# Patient Record
Sex: FEMALE | Race: WHITE
[De-identification: names, ages, dates, MRNs, and addresses within clinical notes are randomized per-mention and may not be internally consistent; named-entity substitution may affect disease eponyms.]

---

## 2019-04-06 ENCOUNTER — HOSPITAL ENCOUNTER (EMERGENCY)
Dept: HOSPITAL 62 - ER | Age: 19
Discharge: HOME | End: 2019-04-06
Payer: COMMERCIAL

## 2019-04-06 VITALS — DIASTOLIC BLOOD PRESSURE: 67 MMHG | SYSTOLIC BLOOD PRESSURE: 125 MMHG

## 2019-04-06 DIAGNOSIS — R07.89: Primary | ICD-10-CM

## 2019-04-06 DIAGNOSIS — Z88.0: ICD-10-CM

## 2019-04-06 PROCEDURE — 93005 ELECTROCARDIOGRAM TRACING: CPT

## 2019-04-06 PROCEDURE — 71045 X-RAY EXAM CHEST 1 VIEW: CPT

## 2019-04-06 PROCEDURE — 99283 EMERGENCY DEPT VISIT LOW MDM: CPT

## 2019-04-06 PROCEDURE — 93010 ELECTROCARDIOGRAM REPORT: CPT

## 2019-04-06 NOTE — ER DOCUMENT REPORT
ED Cardiac





- General


Chief Complaint: Chest Pain


Stated Complaint: LEFT ARM NUMBNESS,CHEST PAIN


Time Seen by Provider: 04/06/19 22:53


Primary Care Provider: 


BELKIS JEAN NP [Primary Care Provider] - Follow up in 3-5 days


TRAVEL OUTSIDE OF THE [place] IN LAST 30 DAYS: No





- HPI


Notes: 





Patient is a 19-year-old female that presents to the emergency department for 

chief complaint of chest and left arm pain.


Patient states she started having a crampy left-sided chest pain today and 

aching in her left shoulder and upper arm.  The pain is constant and worse with 

movement.  She has not taken any medicine at home for her pain and denies 

relieving factors.  She states she has been unpacking boxes at work recently.  

She denies any direct trauma or injury.  She does smoke tobacco but denies 

excessive coughing, fevers and shortness of breath.  Patient states that she 

frequently gets pain in her chest and attributes it to stress.  She denies any 

suicidal or homicidal thoughts but does states she has anxiety and depression 

that she jennifer with at home without medications.  She denies recent surgery, 

travel, hospitalizations, history of DVT/PE, lower extremity cramping or swel

ling.  She is not on any oral estrogen medications.  She reports maternal and 

paternal grandfathers had heart disease but denies any parental heart disease or

DVT/PE.  





Past Medical History: Anxiety, depression





Past Surgical History: Negative





Social History: Daily tobacco.  Occasional alcohol.  Denies drug use





Family History: Reviewed and noncontributory for presenting illness





Allergies: Reviewed, see documented allergy list.








REVIEW OF SYSTEMS:





CONSTITUTIONAL : 





No fever





No chills





No diaphoresis





No recent illness





EENT:





No vision changes





No congestion





No sore throat  





CARDIOVASCULAR:





chest pain





No palpitations





RESPIRATORY:





No shortness of breath





No cough





No difficulty breathing





GASTROINTESTINAL: 





No abdominal pain





No nausea





No vomiting





No diarrhea





GENITOURINARY:





No dysuria





No hematuria





No difficulty urinating





MUSCULOSKELETAL:





No back pain





No leg pain





 arm pain





SKIN:  





No rashes





No lesions





LYMPHATIC: 





No swollen, enlarged glands.





NEUROLOGICAL: 





No lightheadedness





No headache





No weakness





No paresthesias





PSYCHIATRIC:





No anxiety





No depression 








PHYSICAL EXAMINATION:





Vital signs reviewed, nursing noted reviewed.





GENERAL: Well-appearing, well-nourished and in no acute distress.





HEAD: Atraumatic, normocephalic.





EYES: Eyes appear normal, extraocular movements intact, sclera anicteric, 

conjunctiva are normal.





ENT: nares patent, oropharynx clear without exudates.  Moist mucous membranes.





NECK: Normal range of motion, supple without lymphadenopathy





LUNGS: Tenderness to palpation of left anterior and posterior chest wall, no 

tachypnea, breath sounds clear to auscultation bilaterally and equal.  No 

wheezes rales or rhonchi.





HEART: Regular rate and rhythm without murmurs





ABDOMEN: Soft, nontender, normoactive bowel sounds.  No rebound, guarding, or 

rigidity. No masses appreciated.





EXTREMITIES: Tenderness along medial border of left scapula, no bony tenderness 

to the left shoulder, normal left shoulder range of motion with no joint 

effusion or erythema.  Negative Homans sign bilaterally.  No pitting or edema. 





NEUROLOGICAL: No focal neurological deficits. Moves all extremities 

spontaneously Motor and sensory grossly intact on exam.





PSYCH: Normal mood, normal affect.





SKIN: Warm, Dry, normal turgor, no rashes or lesions noted on exposed skin











- Related Data


Allergies/Adverse Reactions: 


                                        





amoxicillin [Amoxicillin] Adverse Reaction (Intermediate, Verified 08/07/14 

12:13)


   Hives











Past Medical History





- Social History


Smoking Status: Never Smoker


Chew tobacco use (# tins/day): No


Frequency of alcohol use: None


Drug Abuse: None


Family History: Reviewed & Not Pertinent


Patient has suicidal ideation: No


Patient has homicidal ideation: No


Renal/ Medical History: Denies: Hx Peritoneal Dialysis





- Immunizations


Immunizations up to date: Yes


Hx Diphtheria, Pertussis, Tetanus Vaccination: No





Physical Exam





- Vital signs


Vitals: 


                                        











Temp Pulse Resp BP Pulse Ox


 


 98.9 F   94   16   125/67   98 


 


 04/06/19 22:17  04/06/19 22:17  04/06/19 22:17  04/06/19 22:17  04/06/19 22:17














Course





- Re-evaluation


Re-evalutation: 





04/06/19 23:12


Vitals reviewed.  Nursing notes reviewed.  Patient's EKG shows no acute ischemic

 changes.  Her left anterior and posterior chest walls as well as medial left 

scapular border are tender.  She does have a repetitive job and her symptoms are

 likely musculoskeletal.  She is PERC negative and I do not clinically suspect 

pulmonary embolism.  Chest x-ray shows no pneumothorax.  Patient will be 

discharged home with a course of anti-inflammatories.  She was counseled on 

smoking cessation.  She was counseled on return precautions.  She is in 

agreement with plan of care at discharge.





                                        





Chest X-Ray  04/06/19 22:54


IMPRESSION:


 


Normal chest x-ray.


 


 


copyright 2011 [employer]- All Rights Reserved


 

















- Vital Signs


Vital signs: 


                                        











Temp Pulse Resp BP Pulse Ox


 


 98.9 F   94   16   125/67   98 


 


 04/06/19 22:17  04/06/19 22:17  04/06/19 22:17  04/06/19 22:17  04/06/19 22:17














- EKG Interpretation by Me


Additional EKG results interpreted by me: 





04/06/19 23:13


Interpreted by myself


2213: Normal sinus rhythm, rate 86, normal axis, no ectopy, no Wellens or 

Brugada





Discharge





- Discharge


Clinical Impression: 


 Left-sided chest wall pain





Condition: Stable


Disposition: HOME, SELF-CARE


Instructions:  Chest Wall Pain ([facility])


Additional Instructions: 


Please return to the emergency department if you have any worsening, or concern 

of your symptoms.





Please return to the emergency department if you develop chest pain, difficulty 

breathing, severe abdominal pain, or ongoing vomiting.





Please follow-up with your primary care physician in 2-3 days and any other 

recommended physicians.





If prescribed, take all medications as directed. 





If you have any questions or concerns do not hesitate to return the emergency 

department for evaluation.











Prescriptions: 


Naproxen 500 mg PO BID PRN #30 tablet


 PRN Reason: Pain Scale Of 1


Forms:  Smoking Cessation Education


Referrals: 


BELKIS JEAN NP [Primary Care Provider] - Follow up in 3-5 days

## 2019-04-06 NOTE — RADIOLOGY REPORT (SQ)
EXAM DESCRIPTION: 



XR CHEST 1 VIEW



COMPLETED DATE/TME:  04/06/2019 22:54



CLINICAL HISTORY: 



19 years, Female, chest pain



COMPARISON:

None.



NUMBER OF VIEWS:





TECHNIQUE:





LIMITATIONS:

None.



FINDINGS:



No evidence of pulmonary infiltrate or pleural effusion.



The heart and mediastinum are unremarkable.



Pulmonary vascularity appears normal.



IMPRESSION:



Normal chest x-ray.

 



copyright 2011 [employer]- All Rights Reserved

## 2019-04-07 NOTE — EKG REPORT
SEVERITY:- BORDERLINE ECG -

SINUS RHYTHM

BORDERLINE T ABNORMALITIES, INFERIOR LEADS

:

Confirmed by: Rigoberto Lind MD 07-Apr-2019 09:33:23

## 2020-12-06 ENCOUNTER — HOSPITAL ENCOUNTER (EMERGENCY)
Dept: HOSPITAL 62 - ER | Age: 20
LOS: 1 days | Discharge: HOME | End: 2020-12-07
Payer: SELF-PAY

## 2020-12-06 DIAGNOSIS — L29.2: Primary | ICD-10-CM

## 2020-12-06 DIAGNOSIS — F17.200: ICD-10-CM

## 2020-12-06 DIAGNOSIS — R10.9: ICD-10-CM

## 2020-12-06 DIAGNOSIS — R10.2: ICD-10-CM

## 2020-12-06 PROCEDURE — 87591 N.GONORRHOEAE DNA AMP PROB: CPT

## 2020-12-06 PROCEDURE — 81001 URINALYSIS AUTO W/SCOPE: CPT

## 2020-12-06 PROCEDURE — 81025 URINE PREGNANCY TEST: CPT

## 2020-12-06 PROCEDURE — 87210 SMEAR WET MOUNT SALINE/INK: CPT

## 2020-12-06 PROCEDURE — 99284 EMERGENCY DEPT VISIT MOD MDM: CPT

## 2020-12-06 PROCEDURE — 76830 TRANSVAGINAL US NON-OB: CPT

## 2020-12-06 PROCEDURE — 93976 VASCULAR STUDY: CPT

## 2020-12-06 PROCEDURE — 87491 CHLMYD TRACH DNA AMP PROBE: CPT

## 2020-12-07 VITALS — DIASTOLIC BLOOD PRESSURE: 74 MMHG | SYSTOLIC BLOOD PRESSURE: 118 MMHG

## 2020-12-07 LAB
APPEARANCE UR: CLEAR
APTT PPP: YELLOW S
BILIRUB UR QL STRIP: NEGATIVE
CHLAM PCR: NOT DETECTED
GLUCOSE UR STRIP-MCNC: NEGATIVE MG/DL
KETONES UR STRIP-MCNC: NEGATIVE MG/DL
NITRITE UR QL STRIP: NEGATIVE
PH UR STRIP: 6 [PH] (ref 5–9)
PROT UR STRIP-MCNC: NEGATIVE MG/DL
SP GR UR STRIP: 1.03
T.VAGINALIS (WET MOUNT): (no result)
UROBILINOGEN UR-MCNC: 4 MG/DL (ref ?–2)
WBCS (WET MOUNT): (no result)
YEAST (WET MOUNT): (no result)

## 2020-12-07 NOTE — RADIOLOGY REPORT (SQ)
Ultrasound pelvis transvaginal on 12/7/2020 at 1:56 AM



CLINICAL INDICATION: Pelvic pain



COMPARISON: None



FINDINGS: Multiple sonographic images are obtained throughout the

pelvis by transvaginal approach, both transverse and sagittal

images are obtained. The uterus measures approximately 7.6 x 2.6

x 4.1 cm. Small amount of fluid is noted in the endocervical

canal. Small nabothian cyst is noted in the cervix. The right

ovary measures approximately 5.0 x 2.5 x 2.2 cm. Flow is

demonstrated in the right ovary. Within the right ovary there is

a 2.6 x 2.3 x 2.9 cm dominant follicle which should be considered

benign with no follow-up recommended. Left ovary measures

approximately 2.6 x 3.2 x 2.7 cm. Flow is demonstrated in the

left ovary. No adnexal mass or fluid collection is noted.

Endometrial stripe measures 4 mm which is within normal limits.



IMPRESSION: Essentially unremarkable exam.

## 2020-12-07 NOTE — ER DOCUMENT REPORT
ED GI/





- General


Chief Complaint: Vaginal Itching


Stated Complaint: PELVIC PAIN/VAGINAL IRRITATION


Time Seen by Provider: 12/07/20 00:49


Primary Care Provider: 


BELKIS JEAN NP [Primary Care Provider] - Follow up as needed


Mode of Arrival: Ambulatory


Information source: Patient


Notes: 





Patient presents complaining of lower pelvic pain for the past 3 days with v

aginal discharge, itching and odor.  Patient states she is attempted to treat 

her symptoms with apple cider vinegar baths.  Patient denies any urinary 

symptoms.  Patient denies any fever.  Patient denies any concerns about STI.


TRAVEL OUTSIDE OF THE [place] IN LAST 30 DAYS: No





- HPI


Patient complains to provider of: Pelvic pain, Vaginal discharge.  No: Dysuria, 

Vomiting


Onset: Other - 3 days


Timing/Duration: Gradual


Quality of pain: Achy


Pain Level: 1


Location: Pelvis


Vaginal bleeding (Compared to normal period): None


Menstrual period history: denies: Pregnant


Associated symptoms: Vaginal discharge.  denies: Diarrhea, Dysuria, Fever, 

Nausea, Urinary hesitancy, Vomiting


Exacerbated by: Denies


Relieved by: Denies


Similar symptoms previously: No


Recently seen / treated by doctor: No





- Related Data


Allergies/Adverse Reactions: 


                                        





amoxicillin [Amoxicillin] Adverse Reaction (Intermediate, Verified 08/07/14 

12:13)


   Hives











Past Medical History





- General


Information source: Patient





- Social History


Smoking Status: Current Every Day Smoker


Frequency of alcohol use: Occasional


Drug Abuse: None


Occupation: retail


Family History: Reviewed & Not Pertinent





- Medical History


Medical History: Negative


Renal/ Medical History: Denies: Hx Peritoneal Dialysis


Surgical Hx: Negative





- Immunizations


Immunizations up to date: Yes


Hx Diphtheria, Pertussis, Tetanus Vaccination: No





Review of Systems





- Review of Systems


Constitutional: No symptoms reported.  denies: Fever, Recent illness


EENT: No symptoms reported


Cardiovascular: No symptoms reported


Respiratory: No symptoms reported


Gastrointestinal: Abdominal pain.  denies: Diarrhea, Nausea, Vomiting


Genitourinary: No symptoms reported.  denies: Dysuria, Flank pain


Female Genitourinary: Vaginal discharge.  denies: Pregnant, Vaginal bleeding


Musculoskeletal: No symptoms reported.  denies: Back pain


Skin: No symptoms reported


Hematologic/Lymphatic: No symptoms reported


Neurological/Psychological: No symptoms reported





Physical Exam





- Vital signs


Vitals: 


                                        











Temp Pulse Resp BP Pulse Ox


 


 98.1 F   67   16   131/68 H  98 


 


 12/06/20 23:31  12/06/20 23:31  12/06/20 23:31  12/06/20 23:31  12/06/20 23:31














- General


General appearance: Appears well, Alert


In distress: None





- HEENT


Head: Normocephalic, Atraumatic


Eyes: Normal


Nasal: Normal


Mouth/Lips: Normal


Mucous membranes: Normal


Neck: Normal, Supple.  No: Lymphadenopathy





- Respiratory


Respiratory status: No respiratory distress


Chest status: Nontender


Breath sounds: Normal.  No: Rales, Rhonchi, Stridor, Wheezing


Chest palpation: Normal





- Cardiovascular


Rhythm: Regular


Heart sounds: S1 appreciated, S2 appreciated


Murmur: No





- Abdominal


Inspection: Normal


Distension: No distension


Bowel sounds: Normal


Tenderness: Tender - Lower pelvic


Organomegaly: No organomegaly





- Genitourinary


External exam: Normal


Speculum exam: Cervix closed, Vaginal discharge


Vaginal bleeding: None


Bimanuel exam: Normal.  No: Cervical motion tender, Adnexal tenderness


Notes: 





RN Marcy as standby





- Back


Back: Normal, Nontender.  No: CVA tenderness





- Extremities


General upper extremity: Normal inspection, Normal ROM


General lower extremity: Normal inspection, Normal ROM





- Neurological


Neuro grossly intact: Yes


Cognition: Normal


[place] Coma Scale Eye Opening: Spontaneous


[place] Coma Scale Verbal: Oriented


[place] Coma Scale Motor: Obeys Commands


[place] Coma Scale Total: 15





- Psychological


Associated symptoms: Normal affect, Normal mood





- Skin


Skin Temperature: Warm


Skin Moisture: Dry


Skin Color: Normal





Course





- Re-evaluation


Re-evalutation: 





12/07/20 01:46


Patient's urinalysis without any signs of infection.  Patient is not pregnant.  

Wet prep without any findings worrisome for BV, yeast or trichomonas.  GC 

chlamydia is pending at this time.  Patient is agreeable with having ultrasound 

performed to further evaluate her pelvic pain symptoms tonight.


12/07/20 03:38


Patient ultrasound reviewed, no acute findings, no evidence of TOA or torsion.  

Patient with negative gonorrhea chlamydia tests at this time.  Patient reports 

improvement of pain symptoms at this time.  Will encourage outpatient follow-up 

with gynecologist for recheck after today's visit.  Good return precautions 

discussed with patient.





- Vital Signs


Vital signs: 


                                        











Temp Pulse Resp BP Pulse Ox


 


 98 F   68   16   118/74   100 


 


 12/07/20 03:52  12/07/20 03:52  12/07/20 03:52  12/07/20 03:52  12/07/20 03:52














- Laboratory


Laboratory results interpreted by me: 


                                        











  12/07/20





  00:17


 


Urine Urobilinogen  4.0 H











                              Labs- All tests 24 hr











  12/07/20 12/07/20 12/07/20





  00:17 01:09 01:09


 


Urine Color  YELLOW  


 


Urine Appearance  CLEAR  


 


Urine pH  6.0  


 


Ur Specific Gravity  1.030  


 


Urine Protein  NEGATIVE  


 


Urine Glucose (UA)  NEGATIVE  


 


Urine Ketones  NEGATIVE  


 


Urine Blood  NEGATIVE  


 


Urine Nitrite  NEGATIVE  


 


Urine Bilirubin  NEGATIVE  


 


Urine Urobilinogen  4.0 H  


 


Ur Leukocyte Esterase  NEGATIVE  


 


Urine WBC (Auto)  2  


 


Urine RBC (Auto)  2  


 


Squamous Epi Cells Auto  2  


 


Urine Mucus (Auto)  FEW  


 


Urine Ascorbic Acid  NEGATIVE  


 


Urine HCG, Qual  NEGATIVE  


 


Trichomonas (Wet Prep)    NO TRICHOMONAS SEEN


 


Vaginal WBC    NO WBCS SEEN


 


Vaginal Yeast    NO YEAST SEEN


 


Chlamydia DNA (PCR)   NOT DETECTED 


 


N.gonorrhoeae DNA (PCR)   NOT DETECTED 














- Diagnostic Test


Radiology reviewed: Reports reviewed





Discharge





- Discharge


Clinical Impression: 


 Vaginal discharge, Pelvic pain





Condition: Stable


Disposition: HOME, SELF-CARE


Additional Instructions: 


Return immediately for any new or worsening symptoms; fever, worsening pain, 

vomiting, any concerning new symptoms





Followup with your primary care provider, call tomorrow to make a followup 

appointment





Follow-up with a gynecologist for recheck, call tomorrow for an appointment


Prescriptions: 


Naproxen [Naprosyn 250 Nmg Tablet] 1 tab PO BID #14 tablet


Forms:  Return to Work


Referrals: 


BELKIS JEAN NP [Primary Care Provider] - Follow up as needed

## 2020-12-07 NOTE — ER DOCUMENT REPORT
ED Medical Screen (RME)





- General


Chief Complaint: Vaginal Pain


Stated Complaint: PELVIC PAIN/VAGINAL IRRITATION


Primary Care Provider: 


BELKIS JEAN NP [Primary Care Provider] - Follow up as needed


TRAVEL OUTSIDE OF THE [place] IN LAST 30 DAYS: No





- HPI


Notes: 





12/07/20 00:08


Rapid Medical Exam                                





HPI: Pt is a 21 yo female c/o vaginal discharge and pelvic pain X 3 days.  no 

dysuria. pt is  and denies new sexual partners or concern for STD.  does 

not use birth control. no treatments tried. 











Physical Exam:





GENERAL: Well-appearing, well-nourished and in no acute distress.


HEAD: Atraumatic, normocephalic.


ENT: Moist mucous membranes.


RESP: Respirations even and unlabored


CV- Regular rate. 


NEURO: No focal neurological deficits. Moves all extremities spontaneously and 

on command.








My involvement in this patients care was limited to a rapid initial assessment. 

A comprehensive ED assessment and evaluation of the patient, analysis of test 

results, treatment, and completion of the medical decision making process will 

be performed by other ER providers.





- Related Data


Allergies/Adverse Reactions: 


                                        





amoxicillin [Amoxicillin] Adverse Reaction (Intermediate, Verified 08/07/14 

12:13)


   Hives











Past Medical History


Renal/ Medical History: Denies: Hx Peritoneal Dialysis





- Immunizations


Immunizations up to date: Yes


Hx Diphtheria, Pertussis, Tetanus Vaccination: No





Physical Exam





- Vital signs


Vitals: 





                                        











Temp Pulse Resp BP Pulse Ox


 


 98.1 F   67   16   131/68 H  98 


 


 12/06/20 23:31  12/06/20 23:31  12/06/20 23:31  12/06/20 23:31  12/06/20 23:31














Course





- Vital Signs


Vital signs: 





                                        











Temp Pulse Resp BP Pulse Ox


 


 98.1 F   67   16   131/68 H  98 


 


 12/06/20 23:31  12/06/20 23:31  12/06/20 23:31  12/06/20 23:31  12/06/20 23:31














Doctor's Discharge





- Discharge


Referrals: 


BELKIS JEAN NP [Primary Care Provider] - Follow up as needed